# Patient Record
Sex: MALE | Race: WHITE | NOT HISPANIC OR LATINO | URBAN - METROPOLITAN AREA
[De-identification: names, ages, dates, MRNs, and addresses within clinical notes are randomized per-mention and may not be internally consistent; named-entity substitution may affect disease eponyms.]

---

## 2023-12-19 ENCOUNTER — EMERGENCY (EMERGENCY)
Facility: HOSPITAL | Age: 73
LOS: 0 days | Discharge: HOME | End: 2023-12-19
Attending: EMERGENCY MEDICINE
Payer: MEDICARE

## 2023-12-19 VITALS
RESPIRATION RATE: 18 BRPM | DIASTOLIC BLOOD PRESSURE: 77 MMHG | SYSTOLIC BLOOD PRESSURE: 145 MMHG | OXYGEN SATURATION: 100 % | WEIGHT: 212.97 LBS | HEIGHT: 69 IN | HEART RATE: 91 BPM | TEMPERATURE: 99 F

## 2023-12-19 DIAGNOSIS — Z79.01 LONG TERM (CURRENT) USE OF ANTICOAGULANTS: ICD-10-CM

## 2023-12-19 DIAGNOSIS — I83.892 VARICOSE VEINS OF LEFT LOWER EXTREMITY WITH OTHER COMPLICATIONS: ICD-10-CM

## 2023-12-19 DIAGNOSIS — Z86.711 PERSONAL HISTORY OF PULMONARY EMBOLISM: ICD-10-CM

## 2023-12-19 PROCEDURE — 12001 RPR S/N/AX/GEN/TRNK 2.5CM/<: CPT

## 2023-12-19 PROCEDURE — 12031 INTMD RPR S/A/T/EXT 2.5 CM/<: CPT

## 2023-12-19 PROCEDURE — 99283 EMERGENCY DEPT VISIT LOW MDM: CPT | Mod: 25

## 2023-12-19 PROCEDURE — 99282 EMERGENCY DEPT VISIT SF MDM: CPT | Mod: 25

## 2023-12-19 NOTE — ED PROCEDURE NOTE - CPROC ED TIME OUT STATEMENT1
“Patient's name, , procedure and correct site were confirmed during the [place] Timeout.” “Patient's name, , procedure and correct site were confirmed during the [place] Timeout.”

## 2023-12-19 NOTE — ED PROVIDER NOTE - CARE PROVIDER_API CALL
JESSA HARMAN  [address]  [place] [address]  Phone: [phone]  Fax: [phone]  Follow Up Time: 1-3 Days   JESSA HARMAN  [address]  [place] [address]  Phone: [phone]  Fax: [phone]  Follow Up Time: 1-3 Days

## 2023-12-19 NOTE — ED PROCEDURE NOTE - ATTENDING APP SHARED VISIT CONTRIBUTION OF CARE
I was present for and supervised the key and critical aspects of the procedures performed during the care of the patient.  laceration repair

## 2023-12-19 NOTE — ED PROVIDER NOTE - CLINICAL SUMMARY MEDICAL DECISION MAKING FREE TEXT BOX
Bleeding from varicose vein after minor cutaneous trauma.  Sutures placed to control bleeding.  Pressure dressing and outpatient follow up.  Stable for discharge.

## 2023-12-19 NOTE — ED PROVIDER NOTE - PATIENT PORTAL LINK FT
You can access the FollowMyHealth Patient Portal offered by [facility] by registering at the following website: [email or web address]. By joining [employer]’s FollowMyHealth portal, you will also be able to view your health information using other applications (apps) compatible with our system. You can access the FollowMyHealth Patient Portal offered by [facility] by registering at the following website: [email or web address]. By joining [employer]’s FollowMyHealth portal, you will also be able to view your health information using other applications (apps) compatible with our system.

## 2023-12-19 NOTE — ED PROVIDER NOTE - NSFOLLOWUPINSTRUCTIONS_ED_ALL_ED_FT
Follow up with primary care doctor in 1-3 days.    Varicose Veins  Varicose veins are veins that have become enlarged, bulged, and twisted. They most often appear in the legs.    What are the causes?  This condition is caused by damage to the valves in the vein. These valves help blood return to your heart. When they are damaged and they stop working properly, blood may flow backward and back up in the veins near the skin, causing the veins to get larger and appear twisted.    The condition can result from any issue that causes blood to back up, like pregnancy, prolonged standing, or obesity.    What increases the risk?  The following factors may make you more likely to develop this condition:  Being on your feet a lot.  Being pregnant.  Being overweight.  Smoking.  Having had a previous deep vein thrombosis or having a thrombotic disorder.  Aging. The risk increases with age.  Having a condition called Klippel–Trenaunay syndrome.  What are the signs or symptoms?  Symptoms of this condition include:  Bulging, twisted, and bluish veins.  A feeling of heaviness in your legs. This may be worse at the end of the day.  Leg pain. This may be worse at the end of the day.  Swelling in the leg.  Changes in skin color over the veins.  Swelling or pain in the legs can limit your activities. Your symptoms may get worse when you sit or stand for long periods of time.    How is this diagnosed?  This condition may be diagnosed based on:  Your symptoms, family history, activity levels, and lifestyle.  A physical exam.  You may also have tests, including an ultrasound or X-ray.    How is this treated?  Treatment for this condition may involve:  Avoiding sitting or standing in one position for long periods of time.  Wearing compression stockings. These stockings help to prevent blood clots and reduce swelling in the legs.  Raising (elevating) the legs when resting.  Losing weight.  Exercising regularly.  If you have persistent symptoms or want to improve the way your varicose veins look, you may choose to have a procedure to close the varicose veins off or to remove them.    Nonsurgical treatments to close off the veins include:  Sclerotherapy. In this treatment, a solution is injected into a vein to close it off.  Laser treatment. The vein is heated with a laser to close it off.  Radiofrequency vein ablation. An electrical current produced by radio waves is used to close off the vein.  Surgical treatments to remove the veins include:  Phlebectomy. In this procedure, the veins are removed through small incisions made over the veins.  Vein ligation and stripping. In this procedure, incisions are made over the veins. The veins are then removed after being tied (ligated) with stitches (sutures).  Follow these instructions at home:  Medicines    Take over-the-counter and prescription medicines only as told by your health care provider.  If you were prescribed an antibiotic medicine, use it as told by your health care provider. Do not stop using the antibiotic even if you start to feel better.  Activity    Walk as much as possible. Walking increases blood flow. This helps blood return to the heart and takes pressure off your veins.  Do not stand or sit in one position for a long period of time.  Do not sit with your legs crossed.  Avoid sitting for a long time without moving. Get up to take short walks every 1–2 hours. This is important to improve blood flow and breathing. Ask for help if you feel weak or unsteady.  Return to your normal activities as told by your health care provider. Ask your health care provider what activities are safe for you.  Do exercises as told by your health care provider.  General instructions    Compression stockings on a person's lower legs.  Follow any diet instructions given to you by your health care provider.  Elevate your legs at night to above the level of your heart.  If you get a cut in the skin over the varicose vein and the vein bleeds:  Lie down with your leg raised.  Apply firm pressure to the cut with a clean cloth until the bleeding stops.  Place a bandage (dressing) on the cut.  Drink enough fluid to keep your urine pale yellow.  Do not use any products that contain nicotine or tobacco. These products include cigarettes, chewing tobacco, and vaping devices, such as e-cigarettes. If you need help quitting, ask your health care provider.  Wear compression stockings as told by your health care provider. Do not wear other kinds of tight clothing around your legs, pelvis, or waist.  Keep all follow-up visits. This is important.  Contact a health care provider if:  The skin around your varicose veins starts to break down.  You have more pain, redness, tenderness, or hard swelling over a vein.  You are uncomfortable because of pain.  You get a cut in the skin over a varicose vein and it will not stop bleeding.  Get help right away if:  You have chest pain.  You have trouble breathing.  You have severe leg pain.  Summary  Varicose veins are veins that have become enlarged, bulged, and twisted. They most often appear in the legs.  This condition is caused by damage to the valves in the vein. These valves help blood return to your heart.  Treatment for this condition includes frequent movements, wearing compression stockings, losing weight, and exercising regularly. In some cases, procedures are done to close off or remove the veins.  Nonsurgical treatments to close off the veins include sclerotherapy, laser therapy, and radiofrequency vein ablation.  This information is not intended to replace advice given to you by your health care provider. Make sure you discuss any questions you have with your health care provider.

## 2023-12-19 NOTE — ED PROVIDER NOTE - PROVIDER TOKENS
PROVIDER:[TOKEN:[[id number]],FOLLOWUP:[1-3 Days]] PROVIDER:[TOKEN:[[id number]],FOLLOWUP:[1-3 Days]]

## 2023-12-19 NOTE — ED PROVIDER NOTE - OBJECTIVE STATEMENT
73-year-old male past medical history of PE on Eliquis presents with bleeding from a left lower extremity varicose vein.  Patient states bleeding started a few hours prior to arrival after he got out of the shower.  Patient states he went to urgent care and was referred to the ED for further management.  Denies headache, lightheadedness, chest pain, shortness of breath, pain to the leg, loss of sensation, weakness.

## 2023-12-19 NOTE — ED ADULT TRIAGE NOTE - CHIEF COMPLAINT QUOTE
pt BIBA for bleeding varicose vein. on eliqus sent in from urgent care, they attempted to do a stitch that didn't hold.

## 2023-12-19 NOTE — ED PROVIDER NOTE - PHYSICAL EXAMINATION
Vital Signs: I have reviewed the initial vital signs.  CONSTITUTIONAL: Pt in no acute distress laying on stretcher.  SKIN: Skin exam is warm and dry, no acute rash.  MSK: +varicose to medial aspect of LLE above ankle with mild active bleeding but controlled. DP pulses intact b/l. Normal ROM. No clubbing, cyanosis or edema.

## 2023-12-19 NOTE — ED ADULT NURSE NOTE - NSFALLUNIVINTERV_ED_ALL_ED
Bed/Stretcher in lowest position, wheels locked, appropriate side rails in place/Call bell, personal items and telephone in reach/Instruct patient to call for assistance before getting out of bed/chair/stretcher/Non-slip footwear applied when patient is off stretcher/[place] to call system/Physically safe environment - no spills, clutter or unnecessary equipment/Purposeful proactive rounding/Room/bathroom lighting operational, light cord in reach Bed/Stretcher in lowest position, wheels locked, appropriate side rails in place/Call bell, personal items and telephone in reach/Instruct patient to call for assistance before getting out of bed/chair/stretcher/Non-slip footwear applied when patient is off stretcher/[place] to call system/Physically safe environment - no spills, clutter or unnecessary equipment/Purposeful proactive rounding/Room/bathroom lighting operational, light cord in reach